# Patient Record
Sex: FEMALE | Race: WHITE
[De-identification: names, ages, dates, MRNs, and addresses within clinical notes are randomized per-mention and may not be internally consistent; named-entity substitution may affect disease eponyms.]

---

## 2019-12-06 ENCOUNTER — HOSPITAL ENCOUNTER (EMERGENCY)
Dept: HOSPITAL 88 - ER | Age: 74
Discharge: HOME | End: 2019-12-06
Payer: MEDICARE

## 2019-12-06 VITALS — DIASTOLIC BLOOD PRESSURE: 75 MMHG | SYSTOLIC BLOOD PRESSURE: 145 MMHG

## 2019-12-06 VITALS — BODY MASS INDEX: 33.04 KG/M2 | HEIGHT: 61 IN | WEIGHT: 175 LBS

## 2019-12-06 DIAGNOSIS — E78.5: ICD-10-CM

## 2019-12-06 DIAGNOSIS — R07.89: Primary | ICD-10-CM

## 2019-12-06 DIAGNOSIS — K21.9: ICD-10-CM

## 2019-12-06 DIAGNOSIS — I10: ICD-10-CM

## 2019-12-06 LAB
ALBUMIN SERPL-MCNC: 4 G/DL (ref 3.5–5)
ALBUMIN/GLOB SERPL: 1.1 {RATIO} (ref 0.8–2)
ALP SERPL-CCNC: 114 IU/L (ref 40–150)
ALT SERPL-CCNC: 38 IU/L (ref 0–55)
ANION GAP SERPL CALC-SCNC: 16.7 MMOL/L (ref 8–16)
BASOPHILS # BLD AUTO: 0.1 10*3/UL (ref 0–0.1)
BASOPHILS NFR BLD AUTO: 0.5 % (ref 0–1)
BUN SERPL-MCNC: 12 MG/DL (ref 7–26)
BUN/CREAT SERPL: 16 (ref 6–25)
CALCIUM SERPL-MCNC: 10 MG/DL (ref 8.4–10.2)
CHLORIDE SERPL-SCNC: 103 MMOL/L (ref 98–107)
CK MB SERPL-MCNC: 0.7 NG/ML (ref 0–5)
CK SERPL-CCNC: 46 IU/L (ref 29–168)
CO2 SERPL-SCNC: 21 MMOL/L (ref 22–29)
DEPRECATED NEUTROPHILS # BLD AUTO: 7.3 10*3/UL (ref 2.1–6.9)
EGFRCR SERPLBLD CKD-EPI 2021: > 60 ML/MIN (ref 60–?)
EOSINOPHIL # BLD AUTO: 0.1 10*3/UL (ref 0–0.4)
EOSINOPHIL NFR BLD AUTO: 0.8 % (ref 0–6)
ERYTHROCYTE [DISTWIDTH] IN CORD BLOOD: 13.9 % (ref 11.7–14.4)
GLOBULIN PLAS-MCNC: 3.5 G/DL (ref 2.3–3.5)
GLUCOSE SERPLBLD-MCNC: 124 MG/DL (ref 74–118)
HCT VFR BLD AUTO: 42.3 % (ref 34.2–44.1)
HGB BLD-MCNC: 14 G/DL (ref 12–16)
LYMPHOCYTES # BLD: 1.7 10*3/UL (ref 1–3.2)
LYMPHOCYTES NFR BLD AUTO: 17.1 % (ref 18–39.1)
MCH RBC QN AUTO: 28.6 PG (ref 28–32)
MCHC RBC AUTO-ENTMCNC: 33.1 G/DL (ref 31–35)
MCV RBC AUTO: 86.5 FL (ref 81–99)
MONOCYTES # BLD AUTO: 0.6 10*3/UL (ref 0.2–0.8)
MONOCYTES NFR BLD AUTO: 6.4 % (ref 4.4–11.3)
NEUTS SEG NFR BLD AUTO: 74.7 % (ref 38.7–80)
PLATELET # BLD AUTO: 243 X10E3/UL (ref 140–360)
POTASSIUM SERPL-SCNC: 3.7 MMOL/L (ref 3.5–5.1)
RBC # BLD AUTO: 4.89 X10E6/UL (ref 3.6–5.1)
SODIUM SERPL-SCNC: 137 MMOL/L (ref 136–145)

## 2019-12-06 PROCEDURE — 82553 CREATINE MB FRACTION: CPT

## 2019-12-06 PROCEDURE — 83880 ASSAY OF NATRIURETIC PEPTIDE: CPT

## 2019-12-06 PROCEDURE — 82550 ASSAY OF CK (CPK): CPT

## 2019-12-06 PROCEDURE — 93005 ELECTROCARDIOGRAM TRACING: CPT

## 2019-12-06 PROCEDURE — 36415 COLL VENOUS BLD VENIPUNCTURE: CPT

## 2019-12-06 PROCEDURE — 99284 EMERGENCY DEPT VISIT MOD MDM: CPT

## 2019-12-06 PROCEDURE — 71045 X-RAY EXAM CHEST 1 VIEW: CPT

## 2019-12-06 PROCEDURE — 80053 COMPREHEN METABOLIC PANEL: CPT

## 2019-12-06 PROCEDURE — 84484 ASSAY OF TROPONIN QUANT: CPT

## 2019-12-06 PROCEDURE — 85025 COMPLETE CBC W/AUTO DIFF WBC: CPT

## 2019-12-06 NOTE — DIAGNOSTIC IMAGING REPORT
Chest, portable AP view



History: Chest pain



Comparison: No comparisons available for review



IMPRESSION:



The heart is within normal limits of size. The thoracic aorta demonstrates

atherosclerotic calcifications. No focal consolidation, sizable pleural

effusion, or pneumothorax. No acute osseous abnormalities.



Signed by: Stephen E Dreyer, MD on 12/6/2019 5:04 PM

## 2019-12-06 NOTE — NUR
RT AC PIV DC'D AT THIS TIME, CATH INTACT, SITE WITHOUT COMPLICATIONS.  PT AMB 
OUT TO PRIVATE AUTO, NO S/S DISTRESS NOTED.